# Patient Record
Sex: FEMALE
[De-identification: names, ages, dates, MRNs, and addresses within clinical notes are randomized per-mention and may not be internally consistent; named-entity substitution may affect disease eponyms.]

---

## 2017-07-25 ENCOUNTER — FORM ENCOUNTER (OUTPATIENT)
Age: 82
End: 2017-07-25

## 2018-10-16 ENCOUNTER — FORM ENCOUNTER (OUTPATIENT)
Age: 83
End: 2018-10-16

## 2019-10-22 ENCOUNTER — FORM ENCOUNTER (OUTPATIENT)
Age: 84
End: 2019-10-22

## 2020-11-12 ENCOUNTER — FORM ENCOUNTER (OUTPATIENT)
Age: 85
End: 2020-11-12

## 2021-09-22 PROBLEM — Z00.00 ENCOUNTER FOR PREVENTIVE HEALTH EXAMINATION: Status: ACTIVE | Noted: 2021-09-22

## 2021-11-24 ENCOUNTER — APPOINTMENT (OUTPATIENT)
Dept: BREAST CENTER | Facility: CLINIC | Age: 86
End: 2021-11-24

## 2022-05-03 ENCOUNTER — NON-APPOINTMENT (OUTPATIENT)
Age: 87
End: 2022-05-03

## 2022-05-10 ENCOUNTER — APPOINTMENT (OUTPATIENT)
Dept: BREAST CENTER | Facility: CLINIC | Age: 87
End: 2022-05-10

## 2022-05-31 ENCOUNTER — APPOINTMENT (OUTPATIENT)
Dept: BREAST CENTER | Facility: CLINIC | Age: 87
End: 2022-05-31
Payer: MEDICARE

## 2022-05-31 VITALS
DIASTOLIC BLOOD PRESSURE: 110 MMHG | BODY MASS INDEX: 18.57 KG/M2 | SYSTOLIC BLOOD PRESSURE: 184 MMHG | WEIGHT: 98.38 LBS | HEART RATE: 78 BPM | HEIGHT: 61 IN

## 2022-05-31 DIAGNOSIS — Z80.3 FAMILY HISTORY OF MALIGNANT NEOPLASM OF BREAST: ICD-10-CM

## 2022-05-31 DIAGNOSIS — Z78.9 OTHER SPECIFIED HEALTH STATUS: ICD-10-CM

## 2022-05-31 DIAGNOSIS — Z12.39 ENCOUNTER FOR OTHER SCREENING FOR MALIGNANT NEOPLASM OF BREAST: ICD-10-CM

## 2022-05-31 PROCEDURE — 99213 OFFICE O/P EST LOW 20 MIN: CPT

## 2022-05-31 RX ORDER — ATORVASTATIN CALCIUM 40 MG/1
40 TABLET, FILM COATED ORAL
Refills: 0 | Status: ACTIVE | COMMUNITY

## 2022-05-31 RX ORDER — HYDROCHLOROTHIAZIDE 12.5 MG/1
TABLET ORAL
Refills: 0 | Status: ACTIVE | COMMUNITY

## 2022-05-31 RX ORDER — METFORMIN HYDROCHLORIDE 500 MG/1
500 TABLET, COATED ORAL
Refills: 0 | Status: ACTIVE | COMMUNITY

## 2022-06-02 NOTE — DATA REVIEWED
[FreeTextEntry1] : 11/13/2020 (R) b/l sMmg: The breasts are almost entirely fatty. There is no mammographic evidence of malignancy. BI-RADS 2. \par \par 5/31/22 (R) bilateral screening mammogram showing scattered areas of fibroglandular density. No suspicious masses, architectural distortion, or significant calcifications are detected. Postsurgical changes are present from reduction mammoplasty/mastopexy. There are extensive bilateral secretory type meg benign calcifications. There is an overlying pacemaker battery pack on the left MLO projection. A mammotome breast biopsy clip remains in situ in the right breast presumably from a remote core biopsy. Benign BIRADS 2

## 2022-06-02 NOTE — HISTORY OF PRESENT ILLNESS
[FreeTextEntry1] : 88 yo female with Mosque Ancestry accompanied by her daughter Becky was previously under the care of Dr. Phelan, last evaluated in 2019. She presents for breast cancer screening in the setting of a strong family history of breast cancer. Pt denies palpable masses, skin lesions/changes, nipple discharge, or other breast complaints.\par \par She has a history of a bilateral breast reduction, cannot recall what year. \par \par The patients mother was diagnosed with bilateral breast cancer; her maternal grandmother had breast cancer diagnosed at age 47; maternal aunt was diagnosed with breast cancer at age 68, maternal . The patient completed genetic testing with reportedly negative results (BRCA1/2 testing only). No panel testing was completed. The patient has two daughters. Patients daughter was given the information for our genetic counselor, Heike Barton to discuss panel genetic testing and family history. \par \par She is s/p [facility]SO in 1984. \par

## 2022-06-02 NOTE — PAST MEDICAL HISTORY
[Surgical Menopause] : The patient is in surgical menopause [Menarche Age ____] : age at menarche was [unfilled] [Menopause Age____] : age at menopause was [unfilled] [Total Preg ___] : G[unfilled] [Live Births ___] : P[unfilled]  [Age At Live Birth ___] : Age at live birth: [unfilled] [History of Hormone Replacement Treatment] : has no history of hormone replacement treatment [de-identified] : Total Hysterectomy at age 50 [FreeTextEntry5] : Total Hysterectomy at age 50 [FreeTextEntry6] : no [FreeTextEntry7] : Yes [FreeTextEntry8] : no

## 2023-03-15 ENCOUNTER — NON-APPOINTMENT (OUTPATIENT)
Age: 88
End: 2023-03-15

## 2023-06-06 ENCOUNTER — APPOINTMENT (OUTPATIENT)
Dept: BREAST CENTER | Facility: CLINIC | Age: 88
End: 2023-06-06